# Patient Record
Sex: FEMALE | Race: BLACK OR AFRICAN AMERICAN | NOT HISPANIC OR LATINO | URBAN - METROPOLITAN AREA
[De-identification: names, ages, dates, MRNs, and addresses within clinical notes are randomized per-mention and may not be internally consistent; named-entity substitution may affect disease eponyms.]

---

## 2024-09-06 ENCOUNTER — OFFICE VISIT (OUTPATIENT)
Dept: FAMILY MEDICINE | Facility: CLINIC | Age: 34
End: 2024-09-06
Payer: COMMERCIAL

## 2024-09-06 ENCOUNTER — LAB VISIT (OUTPATIENT)
Dept: LAB | Facility: HOSPITAL | Age: 34
End: 2024-09-06
Attending: INTERNAL MEDICINE
Payer: COMMERCIAL

## 2024-09-06 VITALS
SYSTOLIC BLOOD PRESSURE: 120 MMHG | HEART RATE: 80 BPM | TEMPERATURE: 98 F | WEIGHT: 202.19 LBS | DIASTOLIC BLOOD PRESSURE: 82 MMHG | HEIGHT: 64 IN | BODY MASS INDEX: 34.52 KG/M2 | RESPIRATION RATE: 18 BRPM

## 2024-09-06 DIAGNOSIS — Z00.00 ANNUAL PHYSICAL EXAM: ICD-10-CM

## 2024-09-06 DIAGNOSIS — Z00.00 ANNUAL PHYSICAL EXAM: Primary | ICD-10-CM

## 2024-09-06 DIAGNOSIS — F41.1 GAD (GENERALIZED ANXIETY DISORDER): ICD-10-CM

## 2024-09-06 LAB
ALBUMIN SERPL BCP-MCNC: 3.4 G/DL (ref 3.5–5.2)
ALP SERPL-CCNC: 164 U/L (ref 55–135)
ALT SERPL W/O P-5'-P-CCNC: 11 U/L (ref 10–44)
ANION GAP SERPL CALC-SCNC: 12 MMOL/L (ref 8–16)
AST SERPL-CCNC: 21 U/L (ref 10–40)
BASOPHILS # BLD AUTO: 0.03 K/UL (ref 0–0.2)
BASOPHILS NFR BLD: 0.5 % (ref 0–1.9)
BILIRUB SERPL-MCNC: 0.5 MG/DL (ref 0.1–1)
BUN SERPL-MCNC: 6 MG/DL (ref 6–20)
CALCIUM SERPL-MCNC: 9.4 MG/DL (ref 8.7–10.5)
CHLORIDE SERPL-SCNC: 109 MMOL/L (ref 95–110)
CHOLEST SERPL-MCNC: 149 MG/DL (ref 120–199)
CHOLEST/HDLC SERPL: 3.2 {RATIO} (ref 2–5)
CO2 SERPL-SCNC: 20 MMOL/L (ref 23–29)
CREAT SERPL-MCNC: 0.7 MG/DL (ref 0.5–1.4)
DIFFERENTIAL METHOD BLD: ABNORMAL
EOSINOPHIL # BLD AUTO: 0 K/UL (ref 0–0.5)
EOSINOPHIL NFR BLD: 0.7 % (ref 0–8)
ERYTHROCYTE [DISTWIDTH] IN BLOOD BY AUTOMATED COUNT: 14.7 % (ref 11.5–14.5)
EST. GFR  (NO RACE VARIABLE): >60 ML/MIN/1.73 M^2
ESTIMATED AVG GLUCOSE: 120 MG/DL (ref 68–131)
GLUCOSE SERPL-MCNC: 104 MG/DL (ref 70–110)
HBA1C MFR BLD: 5.8 % (ref 4–5.6)
HCT VFR BLD AUTO: 38.6 % (ref 37–48.5)
HDLC SERPL-MCNC: 46 MG/DL (ref 40–75)
HDLC SERPL: 30.9 % (ref 20–50)
HGB BLD-MCNC: 12 G/DL (ref 12–16)
IMM GRANULOCYTES # BLD AUTO: 0.01 K/UL (ref 0–0.04)
IMM GRANULOCYTES NFR BLD AUTO: 0.2 % (ref 0–0.5)
LDLC SERPL CALC-MCNC: 72.6 MG/DL (ref 63–159)
LYMPHOCYTES # BLD AUTO: 2 K/UL (ref 1–4.8)
LYMPHOCYTES NFR BLD: 34.4 % (ref 18–48)
MCH RBC QN AUTO: 26.7 PG (ref 27–31)
MCHC RBC AUTO-ENTMCNC: 31.1 G/DL (ref 32–36)
MCV RBC AUTO: 86 FL (ref 82–98)
MONOCYTES # BLD AUTO: 0.3 K/UL (ref 0.3–1)
MONOCYTES NFR BLD: 4.9 % (ref 4–15)
NEUTROPHILS # BLD AUTO: 3.5 K/UL (ref 1.8–7.7)
NEUTROPHILS NFR BLD: 59.3 % (ref 38–73)
NONHDLC SERPL-MCNC: 103 MG/DL
NRBC BLD-RTO: 0 /100 WBC
PLATELET # BLD AUTO: 346 K/UL (ref 150–450)
PMV BLD AUTO: 11.6 FL (ref 9.2–12.9)
POTASSIUM SERPL-SCNC: 3.3 MMOL/L (ref 3.5–5.1)
PROT SERPL-MCNC: 7.4 G/DL (ref 6–8.4)
RBC # BLD AUTO: 4.5 M/UL (ref 4–5.4)
SODIUM SERPL-SCNC: 141 MMOL/L (ref 136–145)
TRIGL SERPL-MCNC: 152 MG/DL (ref 30–150)
TSH SERPL DL<=0.005 MIU/L-ACNC: 1.01 UIU/ML (ref 0.4–4)
VIT B12 SERPL-MCNC: 347 PG/ML (ref 210–950)
WBC # BLD AUTO: 5.93 K/UL (ref 3.9–12.7)

## 2024-09-06 PROCEDURE — 84443 ASSAY THYROID STIM HORMONE: CPT | Performed by: INTERNAL MEDICINE

## 2024-09-06 PROCEDURE — 80053 COMPREHEN METABOLIC PANEL: CPT | Performed by: INTERNAL MEDICINE

## 2024-09-06 PROCEDURE — 82607 VITAMIN B-12: CPT | Performed by: INTERNAL MEDICINE

## 2024-09-06 PROCEDURE — 83036 HEMOGLOBIN GLYCOSYLATED A1C: CPT | Performed by: INTERNAL MEDICINE

## 2024-09-06 PROCEDURE — 82652 VIT D 1 25-DIHYDROXY: CPT | Performed by: INTERNAL MEDICINE

## 2024-09-06 PROCEDURE — 99999 PR PBB SHADOW E&M-EST. PATIENT-LVL III: CPT | Mod: PBBFAC,,, | Performed by: INTERNAL MEDICINE

## 2024-09-06 PROCEDURE — 80061 LIPID PANEL: CPT | Performed by: INTERNAL MEDICINE

## 2024-09-06 PROCEDURE — 85025 COMPLETE CBC W/AUTO DIFF WBC: CPT | Performed by: INTERNAL MEDICINE

## 2024-09-06 PROCEDURE — 36415 COLL VENOUS BLD VENIPUNCTURE: CPT | Mod: PO | Performed by: INTERNAL MEDICINE

## 2024-09-06 RX ORDER — BUSPIRONE HYDROCHLORIDE 7.5 MG/1
7.5 TABLET ORAL 3 TIMES DAILY
Qty: 90 TABLET | Refills: 11 | Status: SHIPPED | OUTPATIENT
Start: 2024-09-06 | End: 2025-09-06

## 2024-09-06 NOTE — PROGRESS NOTES
Patient ID: Francia Park is a 34 y.o. female.    Chief Complaint: Establish Care, Anxiety, and Stress      History of Present Illness    Ms. Park presents with worsening anxiety and stress related to work, seeking symptom management.    Ms. Park reports anxiety and stress related to work, worsening over the past 3-6 months. Symptoms include overwhelming feelings, difficulty concentrating, lack of focus, and inability to stay on task. Ms. Park also has headaches and chest pain. Anxiety is severe enough to cause a 20-minute delay before entering the workplace building. Ms. Park reports lack of energy as well.    Previously, patient was prescribed Zoloft for anxiety by former PCP, Dr. Ale Lopez. Ms. Park had severe nightmares as a side effect. Dr. Lopez only lowered the dose, which did not resolve the issue. Ms. Park wanted to try a different medication but had difficulty getting appointments or communicating with Dr. Lopez. For lack of energy and focus, patient was prescribed B12 supplements, which were ineffective.    Ms. Park has been searching for a new PCP for the last 3-6 months to address worsening condition, as patient feels it has become unmanageable.      ROS:  General: denies fever, denies chills, denies fatigue, denies weight gain, denies weight loss  Eyes: denies vision changes, denies redness, denies discharge  ENT: denies ear pain, denies nasal congestion, denies sore throat  Cardiovascular: complains of chest pain, denies palpitations, denies lower extremity edema  Respiratory: denies cough, denies shortness of breath  Gastrointestinal: denies abdominal pain, denies nausea, denies vomiting, denies diarrhea, denies constipation, denies blood in stool  Genitourinary: denies dysuria, denies hematuria, denies frequency  Musculoskeletal: denies joint pain, denies muscle pain  Skin: denies rash, denies lesion  Neurological: complains of headache, denies dizziness, denies numbness, denies  "tingling  Psychiatric: complains of anxiety, denies depression, denies sleep difficulty            Physical Exam    General: In no acute distress.  Head: Normocephalic. Non traumatic.  Eyes: PERRLA. EOMs full. Conjunctivae clear. Fundi grossly normal.  Ears: EACs clear. TMs normal.  Nose: Mucosa pink. Mucosa moist. No obstruction.  Throat: Clear. No exudates. No lesions.  Neck: Supple. No masses. No thyromegaly. No bruits.  Chest: Lungs clear. No rales. No rhonchi. No wheezes.  Heart: RRR. No murmurs. No rubs. No gallops.  Abdomen: Soft. No tenderness. No masses. BS normal.  : Normal external genitalia. No lesions. No discharge. No hernias  noted.  Back: Normal curvature. No scoliosis. No tenderness.  Extremities: Warm. Well perfused. No upper extremity edema. No lower extremity edema. FROM. No deformities. No joint erythema.  Neuro: No focal deficits appreciated. Good muscle tone. Normal response to visual stimuli. Normal response to auditory stimuli.  Skin: Normal. No rashes. No lesions noted.            Current Outpatient Medications:     ELURYNG 0.12-0.015 mg/24 hr vaginal ring, INSERT 1 RING VAGINALLY LEAVING IN FOR 24 DAYS, REMOVE FOR 4 DAYS AND INSERT NEW ONE 28 DAYS., Disp: , Rfl:     busPIRone (BUSPAR) 7.5 MG tablet, Take 1 tablet (7.5 mg total) by mouth 3 (three) times daily., Disp: 90 tablet, Rfl: 11            VITAL SIGNS:  Vitals:    09/06/24 1037   BP: 120/82   Pulse: 80   Resp: 18   Temp: 98 °F (36.7 °C)   Weight: 91.7 kg (202 lb 2.6 oz)   Height: 5' 4" (1.626 m)       CURRENT BMI:   Body mass index is 34.7 kg/m².    LABS REVIEWED:    CBC:  Lab Results   Component Value Date    WBC 5.8 06/13/2023    RBC 4.53 06/13/2023    HGB 12.1 06/13/2023    HCT 37.5 06/13/2023    MCV 83 06/13/2023    MCH 26.7 06/13/2023    MCHC 32.3 06/13/2023    RDW 13.8 06/13/2023     06/13/2023    LYMPH 2.5 06/13/2023    MONO 6 06/13/2023    MONO 0.3 06/13/2023    EOS 0.1 06/13/2023    BASO 0.1 06/13/2023    EOSINOPHIL " "2 06/13/2023    BASOPHIL 1 06/13/2023       CHEMISTRY:  Sodium   Date Value Ref Range Status   01/04/2023 141 134 - 144 mmol/L Final     Potassium   Date Value Ref Range Status   01/04/2023 4.3 3.5 - 5.2 mmol/L Final     Chloride   Date Value Ref Range Status   01/04/2023 105 96 - 106 mmol/L Final     CO2   Date Value Ref Range Status   01/04/2023 22 20 - 29 mmol/L Final     Glucose   Date Value Ref Range Status   01/04/2023 90 70 - 99 mg/dL Final     BUN   Date Value Ref Range Status   01/04/2023 6 6 - 20 mg/dL Final     Creatinine   Date Value Ref Range Status   01/04/2023 0.64 0.57 - 1.00 mg/dL Final     Calcium   Date Value Ref Range Status   01/04/2023 9.5 8.7 - 10.2 mg/dL Final     Albumin   Date Value Ref Range Status   01/04/2023 4.4 3.8 - 4.8 g/dL Final     Total Bilirubin   Date Value Ref Range Status   01/04/2023 0.5 0.0 - 1.2 mg/dL Final     AST   Date Value Ref Range Status   01/04/2023 8 0 - 40 IU/L Final     ALT   Date Value Ref Range Status   01/04/2023 7 0 - 32 IU/L Final     eGFR   Date Value Ref Range Status   01/04/2023 120 >59 mL/min/1.73 Final       LIPID PANEL:  Lab Results   Component Value Date    CHOL 159 01/04/2023    CHOL 189 03/25/2021     Lab Results   Component Value Date    TRIG 125 01/04/2023    TRIG 130 03/25/2021     Lab Results   Component Value Date    HDL 47 01/04/2023    HDL 47 03/25/2021     Lab Results   Component Value Date    LDLCALC 90 01/04/2023    LDLCALC 119 (H) 03/25/2021       THYROID:  Lab Results   Component Value Date    TSH 1.320 01/04/2023       DIABETES:  No results found for: "LABA1C", "HGBA1C"  No results found for: "MICALBCREAT"    Assessment and Plan     Assessment & Plan    ANXIETY:  - Assessed patient's anxiety symptoms, which have worsened and are impacting daily functioning.  - Reviewed previous treatment with Zoloft, which was ineffective and caused side effects.  - Considered patient's preference and side effect profile when selecting new " medication.  - Will initiate BuSpar for anxiety management, starting at a low-middle dose.  - Determined that memory and concentration issues are likely related to anxiety; will reassess after anxiety is controlled.  - Explained different classes of anxiety medications, including SSRIs, SNRIs, and others.  - Discussed potential side effects of various medication classes, including weight gain and sexual dysfunction associated with SSRIs.  - Informed patient about the onset of action for BuSpar, noting it may take up to 2 weeks to start working and up to 2 months for full effect.  - Started BuSpar at a low-middle dose (second to lowest dose), to be taken 3 times daily.  - Discontinued Zoloft due to ineffectiveness and side effects.    LABS:  - Ordered CBC, CMP to rule out other potential underlying issues.    FOLLOW UP:  - Follow up in 2 months to assess medication efficacy and adjust dosage if needed.  - Contact the office immediately if experiencing side effects like nightmares or if the medication is not working.          1. Annual physical exam  Comments:  Will obtain CBC, CMP, TSH, A1c, Lipid panel, Vitamin D and Vitamin B12 levels  Orders:  -     CBC Auto Differential; Future; Expected date: 09/06/2024  -     Comprehensive Metabolic Panel; Future; Expected date: 09/06/2024  -     Lipid Panel; Future; Expected date: 09/06/2024  -     Hemoglobin A1C; Future; Expected date: 09/06/2024  -     TSH; Future; Expected date: 09/06/2024  -     Calcitriol; Future; Expected date: 09/06/2024  -     VITAMIN B12; Future; Expected date: 09/06/2024    2. MONICA (generalized anxiety disorder)  Comments:  Discussed medication options with patient. Will start Buspar. WIll see patient in 2 months to assess medication  Orders:  -     busPIRone (BUSPAR) 7.5 MG tablet; Take 1 tablet (7.5 mg total) by mouth 3 (three) times daily.  Dispense: 90 tablet; Refill: 11           Follow up in about 2 months (around 11/6/2024) for Follow up,  Anxiety, with PCP.      This note was generated with the assistance of ambient listening technology. Verbal consent was obtained by the patient and accompanying visitor(s) for the recording of patient appointment to facilitate this note. I attest to having reviewed and edited the generated note for accuracy, though some syntax or spelling errors may persist. Please contact the author of this note for any clarification.

## 2024-09-08 ENCOUNTER — PATIENT MESSAGE (OUTPATIENT)
Dept: FAMILY MEDICINE | Facility: CLINIC | Age: 34
End: 2024-09-08
Payer: COMMERCIAL

## 2024-09-09 ENCOUNTER — TELEPHONE (OUTPATIENT)
Dept: FAMILY MEDICINE | Facility: CLINIC | Age: 34
End: 2024-09-09
Payer: COMMERCIAL

## 2024-09-09 DIAGNOSIS — R74.8 ALKALINE PHOSPHATASE ELEVATION: Primary | ICD-10-CM

## 2024-09-09 PROBLEM — R73.03 PREDIABETES: Status: ACTIVE | Noted: 2024-09-09

## 2024-09-09 NOTE — TELEPHONE ENCOUNTER
----- Message from Melani Bobo DO sent at 9/9/2024  7:52 AM CDT -----  Potassium borderline low, recommend varied diet  Alk phos elevated. Will order labs to determine source  A1c is prediabetic. Make dietary changes and have at least 150 minutes of moderate intensity exercise weekly

## 2024-09-09 NOTE — TELEPHONE ENCOUNTER
----- Message from Shae Gibson sent at 9/9/2024 11:54 AM CDT -----  Contact: Francia [phone]  Returning a phone call.    Who left a message for the patient:  Nurse    Do they know what this is regarding:  yes (lab results)    Would they like a phone call back or a response via [employer]:  call back

## 2024-09-10 ENCOUNTER — LAB VISIT (OUTPATIENT)
Dept: LAB | Facility: HOSPITAL | Age: 34
End: 2024-09-10
Attending: INTERNAL MEDICINE
Payer: COMMERCIAL

## 2024-09-10 DIAGNOSIS — R74.8 ALKALINE PHOSPHATASE ELEVATION: ICD-10-CM

## 2024-09-10 PROCEDURE — 82977 ASSAY OF GGT: CPT | Performed by: INTERNAL MEDICINE

## 2024-09-10 PROCEDURE — 36415 COLL VENOUS BLD VENIPUNCTURE: CPT | Mod: PO | Performed by: INTERNAL MEDICINE

## 2024-09-11 LAB — GGT SERPL-CCNC: 32 U/L (ref 8–55)

## 2024-09-12 ENCOUNTER — TELEPHONE (OUTPATIENT)
Dept: FAMILY MEDICINE | Facility: CLINIC | Age: 34
End: 2024-09-12
Payer: COMMERCIAL

## 2024-09-13 ENCOUNTER — TELEPHONE (OUTPATIENT)
Dept: FAMILY MEDICINE | Facility: CLINIC | Age: 34
End: 2024-09-13
Payer: COMMERCIAL

## 2024-09-13 LAB — 1,25(OH)2D3 SERPL-MCNC: 91 PG/ML (ref 20–79)

## 2024-09-16 ENCOUNTER — TELEPHONE (OUTPATIENT)
Dept: FAMILY MEDICINE | Facility: CLINIC | Age: 34
End: 2024-09-16
Payer: COMMERCIAL

## 2025-02-05 NOTE — PROGRESS NOTES
Patient ID: Francia Park is a 34 y.o. female.    Chief Complaint: concentration and focus      History of Present Illness    - Ms. Park presents with concerns about a strong urine odor, possibly indicating a bladder infection or UTI, and issues with focus and concentration affecting work and home life.    Ms. Park reports a strong odor in her urine, which she is concerned may indicate a bladder infection or UTI. She also notes a lack of focus and concentration affecting her performance at work and home. Ms. Park mentions occasional pain in the lower back on the right side, similar to symptoms from a past kidney infection. She acknowledges consuming Dr. Pepper and potentially not drinking enough water, which may contribute to dehydration. Ms. Park reports holding her bladder for extended periods. She expresses concern about her liver, referencing lab work from September or October of last year that showed extremely high liver values.    Ms. Park denies burning sensation during urination, blood in urine, pain in the abdomen, or fevers. She has never been diagnosed with ADHD.      ROS:  General: denies fever, denies chills, denies fatigue, denies weight gain, denies weight loss  Eyes: denies vision changes, denies redness, denies discharge  ENT: denies ear pain, denies nasal congestion, denies sore throat  Cardiovascular: denies chest pain, denies palpitations, denies lower extremity edema  Respiratory: denies cough, denies shortness of breath  Gastrointestinal: denies abdominal pain, denies nausea, denies vomiting, denies diarrhea, denies constipation, denies blood in stool  Genitourinary: denies dysuria, denies hematuria, denies frequency, complains of urine changes  Musculoskeletal: denies joint pain, denies muscle pain, complains of back pain  Skin: denies rash, denies lesion  Neurological: denies headache, denies dizziness, denies numbness, denies tingling  Psychiatric: denies anxiety, denies depression,  "denies sleep difficulty            Physical Exam    General: In no acute distress.  Head: Normocephalic. Non traumatic.  Eyes: PERRLA. EOMs full. Conjunctivae clear. Fundi grossly normal.  Ears: EACs clear. TMs normal.  Nose: Mucosa pink. Mucosa moist. No obstruction.  Throat: Clear. No exudates. No lesions.  Neck: Supple. No masses. No thyromegaly. No bruits.  Chest: Lungs clear. No rales. No rhonchi. No wheezes.  Heart: RRR. No murmurs. No rubs. No gallops.  Abdomen: Soft. No tenderness. No masses. BS normal.  : Normal external genitalia. No lesions. No discharge. No hernias  noted.  Back: Normal curvature. No scoliosis. No tenderness.  Extremities: Warm. Well perfused. No upper extremity edema. No lower extremity edema. FROM. No deformities. No joint erythema.  Neuro: No focal deficits appreciated. Good muscle tone. Normal response to visual stimuli. Normal response to auditory stimuli.  Skin: Normal. No rashes. No lesions noted.            Current Outpatient Medications:     busPIRone (BUSPAR) 7.5 MG tablet, Take 1 tablet (7.5 mg total) by mouth 3 (three) times daily., Disp: 90 tablet, Rfl: 11    ELURYNG 0.12-0.015 mg/24 hr vaginal ring, INSERT 1 RING VAGINALLY LEAVING IN FOR 24 DAYS, REMOVE FOR 4 DAYS AND INSERT NEW ONE 28 DAYS., Disp: , Rfl:             VITAL SIGNS:  Vitals:    02/06/25 1638   BP: 110/80   Pulse: 80   Resp: 18   Temp: 98.3 °F (36.8 °C)   Weight: 96.6 kg (212 lb 15.4 oz)   Height: 5' 4" (1.626 m)       CURRENT BMI:   Body mass index is 36.56 kg/m².    LABS REVIEWED:    CBC:  Lab Results   Component Value Date    WBC 5.93 09/06/2024    RBC 4.50 09/06/2024    HGB 12.0 09/06/2024    HCT 38.6 09/06/2024    MCV 86 09/06/2024    MCH 26.7 (L) 09/06/2024    MCHC 31.1 (L) 09/06/2024    RDW 14.7 (H) 09/06/2024     09/06/2024    MPV 11.6 09/06/2024    GRAN 3.5 09/06/2024    GRAN 59.3 09/06/2024    LYMPH 2.0 09/06/2024    LYMPH 34.4 09/06/2024    MONO 0.3 09/06/2024    MONO 4.9 09/06/2024    EOS " 0.0 09/06/2024    BASO 0.03 09/06/2024    EOSINOPHIL 0.7 09/06/2024    BASOPHIL 0.5 09/06/2024       CHEMISTRY:  Sodium   Date Value Ref Range Status   09/06/2024 141 136 - 145 mmol/L Final     Potassium   Date Value Ref Range Status   09/06/2024 3.3 (L) 3.5 - 5.1 mmol/L Final     Chloride   Date Value Ref Range Status   09/06/2024 109 95 - 110 mmol/L Final     CO2   Date Value Ref Range Status   09/06/2024 20 (L) 23 - 29 mmol/L Final     Glucose   Date Value Ref Range Status   09/06/2024 104 70 - 110 mg/dL Final     BUN   Date Value Ref Range Status   09/06/2024 6 6 - 20 mg/dL Final     Creatinine   Date Value Ref Range Status   09/06/2024 0.7 0.5 - 1.4 mg/dL Final     Calcium   Date Value Ref Range Status   09/06/2024 9.4 8.7 - 10.5 mg/dL Final     Total Protein   Date Value Ref Range Status   09/06/2024 7.4 6.0 - 8.4 g/dL Final     Albumin   Date Value Ref Range Status   09/06/2024 3.4 (L) 3.5 - 5.2 g/dL Final     Total Bilirubin   Date Value Ref Range Status   09/06/2024 0.5 0.1 - 1.0 mg/dL Final     Comment:     For infants and newborns, interpretation of results should be based  on gestational age, weight and in agreement with clinical  observations.    Premature Infant recommended reference ranges:  Up to 24 hours.............<8.0 mg/dL  Up to 48 hours............<12.0 mg/dL  3-5 days..................<15.0 mg/dL  6-29 days.................<15.0 mg/dL       Alkaline Phosphatase   Date Value Ref Range Status   09/06/2024 164 (H) 55 - 135 U/L Final     AST   Date Value Ref Range Status   09/06/2024 21 10 - 40 U/L Final     ALT   Date Value Ref Range Status   09/06/2024 11 10 - 44 U/L Final     Anion Gap   Date Value Ref Range Status   09/06/2024 12 8 - 16 mmol/L Final     eGFR   Date Value Ref Range Status   09/06/2024 >60.0 >60 mL/min/1.73 m^2 Final       LIPID PANEL:  Lab Results   Component Value Date    CHOL 149 09/06/2024    CHOL 159 01/04/2023     Lab Results   Component Value Date    TRIG 152 (H)  "09/06/2024    TRIG 125 01/04/2023     Lab Results   Component Value Date    HDL 46 09/06/2024    HDL 47 01/04/2023     Lab Results   Component Value Date    LDLCALC 72.6 09/06/2024    LDLCALC 90 01/04/2023       THYROID:  Lab Results   Component Value Date    TSH 1.008 09/06/2024       DIABETES:  Lab Results   Component Value Date    HGBA1C 5.8 (H) 09/06/2024     No results found for: "MICALBCREAT"    Assessment and Plan     Assessment & Plan    URINARY TRACT INFECTION (UTI) / BLADDER INFECTION:  - Evaluated the patient for possible UTI or bladder infection due to reported strong urine odor.  - Ms. Park reports strong urine smell and occasional pain in the lower back on the right side, but denies burning, hematuria, or abdominal pain.  - Noted the patient's history of kidney infection with similar symptoms.  - Ordered urinalysis to check for potential infection.  - Instructed the patient to report any changes in symptoms or if they worsen.  - Noted the patient's history of kidney infections.  - Considered this history in the context of current urinary symptoms.  - Recommend avoiding holding urine for extended periods.    ATTENTION-DEFICIT HYPERACTIVITY DISORDER (ADHD):  - Evaluated the patient for potential Attention-Deficit Hyperactivity Disorder (ADHD) due to reported lack of focus and concentration issues affecting work and home life.  - Noted that the patient has not been previously diagnosed with ADHD.  - Acknowledged the need for an official diagnosis before prescribing medication.  - Referred the patient to Psych for ADHD evaluation and potential diagnosis.    DEHYDRATION:  - Considered dehydration as a possible cause for urine odor.  - Evaluated potential dehydration as a cause of urine odor.  - Noted that the patient reports drinking  Pepper.  - Explained that strong urine odor could be due to dehydration.  - Advised the patient to increase water intake to address potential dehydration.  - Instructed " the patient to monitor urine color and odor as hydration improves.  - Ms. Khannaump to increase water intake to address potential dehydration.    LIVER FUNCTION:  - Assessed liver involvement as an unlikely cause of urine odor.  - Noted the patient's report of previous lab work showing extremely high liver function results.  - Explained that the Gamma-Glutamyl Transferase (GGT) test specifically evaluates liver function, distinguishing it from other elevated lab values that may not indicate liver issues.  - Determined that current symptoms are likely not related to liver function.  - Advised the patient to follow up on any ongoing liver concerns with their primary care physician.          1. Difficulty concentrating  Comments:  Will refer to psych for ADHD testing  Orders:  -     Ambulatory referral/consult to Psychiatry; Future; Expected date: 02/13/2025    2. Urinary tract infection symptoms  Comments:  Low suspicion for UTI but will obtain urinalysis with reflex to culture. Recommend increasing water intake  Orders:  -     Urinalysis, Reflex to Urine Culture Urine, Clean Catch           No follow-ups on file.  36 of total time spent on the encounter, which includes face to face time and non-face to face time preparing to see the patient. This includes obtaining and/or reviewing separately obtained history, performing a medically appropriate examination and/or evaluation, and counseling and educating the patient/family/caregiver. Includes documenting clinical information in the electronic or other health record, independently interpreting results (not separately reported) and communicating results to the patient/family/caregiver, with care coordination (not separately reported). Medications, tests and/or procedures ordered as necessary along with referring and communicating with other health professionals (when not separately reported).      This note was generated with the assistance of ambient listening technology.  Verbal consent was obtained by the patient and accompanying visitor(s) for the recording of patient appointment to facilitate this note. I attest to having reviewed and edited the generated note for accuracy, though some syntax or spelling errors may persist. Please contact the author of this note for any clarification.

## 2025-02-06 ENCOUNTER — OFFICE VISIT (OUTPATIENT)
Dept: FAMILY MEDICINE | Facility: CLINIC | Age: 35
End: 2025-02-06
Payer: COMMERCIAL

## 2025-02-06 VITALS
BODY MASS INDEX: 36.35 KG/M2 | RESPIRATION RATE: 18 BRPM | HEART RATE: 80 BPM | TEMPERATURE: 98 F | HEIGHT: 64 IN | WEIGHT: 212.94 LBS | DIASTOLIC BLOOD PRESSURE: 80 MMHG | SYSTOLIC BLOOD PRESSURE: 110 MMHG

## 2025-02-06 DIAGNOSIS — R39.9 URINARY TRACT INFECTION SYMPTOMS: ICD-10-CM

## 2025-02-06 DIAGNOSIS — R41.840 DIFFICULTY CONCENTRATING: Primary | ICD-10-CM

## 2025-02-06 LAB
BILIRUB UR QL STRIP: NEGATIVE
CLARITY UR REFRACT.AUTO: ABNORMAL
COLOR UR AUTO: YELLOW
GLUCOSE UR QL STRIP: NEGATIVE
HGB UR QL STRIP: ABNORMAL
KETONES UR QL STRIP: NEGATIVE
LEUKOCYTE ESTERASE UR QL STRIP: NEGATIVE
NITRITE UR QL STRIP: NEGATIVE
PH UR STRIP: 6 [PH] (ref 5–8)
PROT UR QL STRIP: NEGATIVE
SP GR UR STRIP: 1.02 (ref 1–1.03)
URN SPEC COLLECT METH UR: ABNORMAL

## 2025-02-06 PROCEDURE — 3079F DIAST BP 80-89 MM HG: CPT | Mod: CPTII,S$GLB,, | Performed by: INTERNAL MEDICINE

## 2025-02-06 PROCEDURE — 99214 OFFICE O/P EST MOD 30 MIN: CPT | Mod: S$GLB,,, | Performed by: INTERNAL MEDICINE

## 2025-02-06 PROCEDURE — 3008F BODY MASS INDEX DOCD: CPT | Mod: CPTII,S$GLB,, | Performed by: INTERNAL MEDICINE

## 2025-02-06 PROCEDURE — 81003 URINALYSIS AUTO W/O SCOPE: CPT | Performed by: INTERNAL MEDICINE

## 2025-02-06 PROCEDURE — 3074F SYST BP LT 130 MM HG: CPT | Mod: CPTII,S$GLB,, | Performed by: INTERNAL MEDICINE

## 2025-02-06 PROCEDURE — 99999 PR PBB SHADOW E&M-EST. PATIENT-LVL III: CPT | Mod: PBBFAC,,, | Performed by: INTERNAL MEDICINE

## 2025-02-06 PROCEDURE — 1159F MED LIST DOCD IN RCRD: CPT | Mod: CPTII,S$GLB,, | Performed by: INTERNAL MEDICINE

## 2025-02-07 ENCOUNTER — TELEPHONE (OUTPATIENT)
Dept: FAMILY MEDICINE | Facility: CLINIC | Age: 35
End: 2025-02-07
Payer: COMMERCIAL

## 2025-02-07 ENCOUNTER — PATIENT MESSAGE (OUTPATIENT)
Dept: FAMILY MEDICINE | Facility: CLINIC | Age: 35
End: 2025-02-07
Payer: COMMERCIAL

## 2025-02-07 NOTE — TELEPHONE ENCOUNTER
----- Message from Melani Bobo DO sent at 2/7/2025  7:21 AM CST -----  Urinalysis negative. Recommend increasing water intake

## 2025-02-12 ENCOUNTER — PATIENT MESSAGE (OUTPATIENT)
Dept: PSYCHIATRY | Facility: CLINIC | Age: 35
End: 2025-02-12
Payer: COMMERCIAL

## 2025-04-28 ENCOUNTER — TELEPHONE (OUTPATIENT)
Dept: FAMILY MEDICINE | Facility: CLINIC | Age: 35
End: 2025-04-28
Payer: COMMERCIAL

## 2025-04-28 NOTE — TELEPHONE ENCOUNTER
Following msg was sent regarding referral you put in. Please advise.    Hello,      We will require a  diagnosis code in order to process the referral. Please update the diagnosis code to lead in with a F-code.     Thank you,     Deanna Restrepo, Behavioral Health

## 2025-05-13 ENCOUNTER — EVALUATION (OUTPATIENT)
Dept: PSYCHIATRY | Facility: CLINIC | Age: 35
End: 2025-05-13
Payer: COMMERCIAL

## 2025-05-13 DIAGNOSIS — F90.9 ATTENTION DEFICIT HYPERACTIVITY DISORDER (ADHD), UNSPECIFIED ADHD TYPE: Primary | ICD-10-CM

## 2025-05-13 DIAGNOSIS — F41.1 GAD (GENERALIZED ANXIETY DISORDER): ICD-10-CM

## 2025-05-13 DIAGNOSIS — F51.01 PRIMARY INSOMNIA: ICD-10-CM

## 2025-05-13 DIAGNOSIS — R41.840 DIFFICULTY CONCENTRATING: ICD-10-CM

## 2025-05-13 PROCEDURE — 99999 PR PBB SHADOW E&M-EST. PATIENT-LVL III: CPT | Mod: PBBFAC,,, | Performed by: STUDENT IN AN ORGANIZED HEALTH CARE EDUCATION/TRAINING PROGRAM

## 2025-05-13 PROCEDURE — 96130 PSYCL TST EVAL PHYS/QHP 1ST: CPT | Mod: S$GLB,,, | Performed by: STUDENT IN AN ORGANIZED HEALTH CARE EDUCATION/TRAINING PROGRAM

## 2025-05-13 NOTE — PROGRESS NOTES
Initial Intake     Name: Francia Park Date of Intake: 2025   MRN: [id number]  Psychologist: Isha Yu, Ph.D.   : 1990  Guardian/s (if applicable):    Age: 34 Years     Gender: Female         CPT CODE:        63664   VISIT TYPE:                  In Person   LOCATION of Psychologist: [facility]   LOCATION of Patient: [facility]   INDIVIDUALS PRESENT:    LENGTH OF SESSION:   PATIENT Face-to-Face TIME:    INSURANCE: [facility] [employer]               REASON FOR ENCOUNTER  Francia  presented for an Intake Interview in preparation for her psychoeducational evaluation.       BEHAVIORAL OBSERVATION    Francia was neatly dressed and well-groomed. No remarkable signs of anxiety or depression were observed. Verbal productivity was average. Content and rate of speech were intact. She was cooperative and responded readily to direct inquiry. Francia's attention span and ability to concentrate were age-appropriate in the context of her intake session. Judgment and insight appeared age appropriate.      EPIC PROBLEM HISTORY at Intake    ICD-10-CM ICD-9-CM   1. Attention deficit hyperactivity disorder (ADHD), unspecified ADHD type  F90.9 314.01   2. MONICA (generalized anxiety disorder)  F41.1 300.02   3. Primary insomnia  F51.01 307.42   4. Difficulty concentrating  R41.840 799.51       Patient Active Problem List    Diagnosis Date Noted    Prediabetes 2024    Vitamin D deficiency disease 2023    B12 deficiency 2023    Alkaline phosphatase elevation 02/10/2023    Other fatigue 02/10/2023    MONICA (generalized anxiety disorder) 2022    Panic disorder 2022    Primary insomnia 2022       Reason for Referral:  Francia was referred for psychological evaluation to assess difficulties with attention, organization, procrastination, mood regulation, and task completion that are  impacting her daily functioning, academic goals, and work performance. The purpose of the evaluation is to clarify diagnostic impressions and guide treatment recommendations, including medication management and potential accommodations.    Presenting Concerns:  Francia reports longstanding challenges with:  Poor focus and distractibility  Procrastination and low motivation  Difficulty with time management and organization  Becoming emotionally overwhelmed, especially when receiving feedback or under pressure  Challenges with task completion and follow-through  Difficulty slowing down thoughts at night, which affects sleep onset    She describes difficulty retaining and comprehending information, particularly in high-reading-load tasks such as her Human Resources certification exams. 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 She has historically performed well academically but required increased effort due to difficulties with attention and procrastination.  At work, she struggles with focus and anxiety, especially before meetings or presentations. She reports feeling nervous and physically anxious (e.g., sweaty hands, palm rubbing). Despite these challenges, she has maintained employment and continues to pursue professional development.    Mental Health History:  Francia reports anxiety-related symptoms, including avoidance, excessive worry, and somatic manifestations (e.g., sweaty hands, mental exhaustion). She describes her mood as standoffish, often feeling tired, overwhelmed, and lacking energy for everyday tasks such as housework and parenting. She has three children and experiences fatigue when attempting to support their homework or manage  the household.  She has not received previous mental health treatment beyond her current medication for hormonal regulation. Her problem list includes prediabetes and vitamin deficiencies, as well as prior documentation of generalized anxiety disorder and panic disorder.    Family & Social History:  Francia was raised in [place], [place], by her mother. She has one sister. No confirmed family history of mental health concerns was reported. She is  with three children and spends most of her free time managing home responsibilities.    Medical and Developmental History:  Francia was born following an uncomplicated pregnancy and delivery. No developmental delays were reported. Her medical history includes prediabetes, vitamin D and B12 deficiencies, and elevated alkaline phosphatase. She uses EluRyng for hormonal contraception. No significant sleep maintenance problems, vision or hearing issues, or neurological trauma were reported.    Diagnostic Impressions:  Provisional Diagnostic Impressions (ICD-10):  F90.0 - Attention-Deficit/Hyperactivity Disorder, Predominantly Inattentive Presentation  F41.1 - Generalized Anxiety Disorder  F51.01 - Primary Insomnia    Plan for Evaluation:  Purpose:  To assess attentional capacity, executive functioning, and emotional regulation to confirm or clarify diagnostic impressions and guide treatment and functional support planning.  Testing Battery May Include:  Cognitive assessment: WAIS-V  Attention/executive functioning: CPT-3 and Brown EF/A  Academic impact: WJ-ToA  Emotional functioning: MMPI-3  CPT Codes Requested:  71641 - Psychological testing evaluation services, first hour (1 unit)  63505 - Each additional hour (1 unit)  43645 - Test administration and scoring, first 30 minutes (1 unit)  24560 - Each additional 30 minutes (7 units)  58962 - Brief emotional/behavioral assessment (2 units)  Estimated Total Testing Time:  6 hours  Feedback Session Requested:  Yes (to be billed  as CPT 94612, individual feedback session)       Isha Yu, Ph.D.  Psychologist  [facility]

## 2025-06-03 ENCOUNTER — PATIENT MESSAGE (OUTPATIENT)
Dept: FAMILY MEDICINE | Facility: CLINIC | Age: 35
End: 2025-06-03
Payer: COMMERCIAL

## 2025-06-03 ENCOUNTER — PATIENT MESSAGE (OUTPATIENT)
Dept: PSYCHIATRY | Facility: CLINIC | Age: 35
End: 2025-06-03
Payer: COMMERCIAL